# Patient Record
Sex: MALE | Race: WHITE | Employment: FULL TIME | URBAN - METROPOLITAN AREA
[De-identification: names, ages, dates, MRNs, and addresses within clinical notes are randomized per-mention and may not be internally consistent; named-entity substitution may affect disease eponyms.]

---

## 2017-01-26 ENCOUNTER — HOSPITAL ENCOUNTER (OUTPATIENT)
Dept: PHYSICAL THERAPY | Age: 62
Discharge: HOME | End: 2017-01-26
Payer: COMMERCIAL

## 2017-01-26 PROCEDURE — 97110 THERAPEUTIC EXERCISES: CPT

## 2017-01-26 PROCEDURE — 97162 PT EVAL MOD COMPLEX 30 MIN: CPT

## 2017-01-26 NOTE — PROGRESS NOTES
[facility]  [address]  [place], [address]  [phone] fax    Plan of Care/ Statement of Necessity for Physical Therapy Services    Patient name: Jennifer Bowen Start of Care: 2017   Referral source: Robert Fu MD : 1955    Medical Diagnosis: Pain in unspecified knee [M25.569]  Bilateral primary osteoarthritis of knee [M17.0]   Onset Date: 2016   Treatment Diagnosis: L TKR   Prior Hospitalization: see medical history Provider#: [id number]   Medications: Verified on Patient summary List    Comorbidities: Depression, Hearing impaired, TIA, B TKR   Prior Level of Function: The patient states he was able to perform road biking prior to his second TKR. Also improved ease of prolonged ambulation and standing. The Plan of Care and following information is based on the information from the initial evaluation. Assessment/ key information: The patient is a 57year old male s/p L TKR on 2016. He states he was in the hospital 3 days and was discharged home, however the knee became red and developed signs of infection so he was readmitted to the hospital for 5 additional days and underwent a series of antibiotics both by mouth and injected form. Since that time he has noted stiffness and difficulty with mobility. He has since returned to work and has difficulty with swelling/edema control after prolonged standing. He has impairments related to the procedure consisting of pain, decreased ROM, decreased flexibility, decreased edema, and poor ADL ease. He will benefit from skilled PT in order to address the above impairments.      Evaluation Complexity History MEDIUM  Complexity : 1-2 comorbidities / personal factors will impact the outcome/ POC ; Examination LOW Complexity : 1-2 Standardized tests and measures addressing body structure, function, activity limitation and / or participation in recreation  ;Presentation MEDIUM Complexity : Evolving with changing characteristics  ; Clinical Decision Making MEDIUM Complexity : FOTO score of 26-74  Overall Complexity Rating: MEDIUM  Problem List: pain affecting function, decrease ROM, decrease strength, edema affecting function, decrease ADL/ functional abilitiies, decrease activity tolerance, decrease flexibility/ joint mobility and decrease transfer abilities   Treatment Plan may include any combination of the following: Therapeutic exercise, Therapeutic activities, Neuromuscular re-education, Physical agent/modality, Gait/balance training, Manual therapy, Patient education, Self Care training, Functional mobility training, Home safety training and Stair training  Patient / Family readiness to learn indicated by: asking questions, trying to perform skills and interest  Persons(s) to be included in education: patient (P)  Barriers to Learning/Limitations: None  Patient Goal (s): As much range of motion as possible  Patient Self Reported Health Status: excellent  Rehabilitation Potential: good    Short Term Goals: To be accomplished in 2 weeks:   1. The patient will be independent and compliant with HEP to maximize therapeutic benefit. 2. The patient improve knee extension to lacking 6 degrees for improved ambulation ease. Long Term Goals: To be accomplished in 4 weeks:   1. The patient will improve FOTO score to 62 to maximize quality of life. 2. The patient will improve knee ROM from 4 to 120 to maximize ease of descending stairs. 3. The patient will improve mid patellar circumferential measurements to 42.5 cm to reduce quad lag and maximize stability in stance. 4. The patient will improve Ely test equal L and R for improved ease of curb negotiation. Frequency / Duration: Patient to be seen 2-3 times per week for 4 weeks.     Patient/ Caregiver education and instruction: Diagnosis, prognosis, self care, activity modification and exercises   [x]  Plan of care has been reviewed with PTA    Krish Patel, PT 1/26/2017 6:51 PM    ________________________________________________________________________    I certify that the above Therapy Services are being furnished while the patient is under my care. I agree with the treatment plan and certify that this therapy is necessary.     [de-identified] Signature:____________________  Date:____________Time: _________    Please sign and return to [facility] [address] [place] [address], [facility] [address]  [phone] fax

## 2017-01-27 NOTE — PROGRESS NOTES
PT DAILY TREATMENT NOTE     Patient Name: Sharon Triana  Date:2017  : 1955  [x]  Patient  Verified  Payor: BLUE CROSS / Plan: Yady Hernandez [id number] PPO / Product Type: PPO /    In time:5:04  Out time:5:55  Total Treatment Time (min): 51  Visit #: 1 of 10    Treatment Area: Pain in unspecified knee [M25.569]  Bilateral primary osteoarthritis of knee [M17.0]    SUBJECTIVE  Pain Level (0-10 scale): 1/10  Any medication changes, allergies to medications, adverse drug reactions, diagnosis change, or new procedure performed?: [x] No    [] Yes (see summary sheet for update)  Subjective functional status/changes:   [] No changes reported  The patient reports a chief complaint of swelling and stiffness L knee.     OBJECTIVE    Modality rationale: decrease edema, decrease inflammation and decrease pain to improve the patients ability to    Min Type Additional Details    [] Estim:  []Unatt       []IFC  []Premod                        []Other:  []w/ice   []w/heat  Position:  Location:    [] Estim: []Att    []TENS instruct  []NMES                    []Other:  []w/US   []w/ice   []w/heat  Position:  Location:    []  Traction: [] Cervical       []Lumbar                       [] Prone          []Supine                       []Intermittent   []Continuous Lbs:  [] before manual  [] after manual    []  Ultrasound: []Continuous   [] Pulsed                           []1MHz   []3MHz W/cm2:  Location:    []  Iontophoresis with dexamethasone         Location: [] Take home patch   [] In clinic    []  Ice     []  heat  []  Ice massage  []  Laser   []  Anodyne Position:  Location:    []  Laser with stim  []  Other:  Position:  Location:    []  Vasopneumatic Device Pressure:       [] lo [] med [] hi   Temperature: [] lo [] med [] hi   [] Skin assessment post-treatment:  []intact []redness- no adverse reaction    []redness  adverse reaction:     41 min [x]Eval                  []Re-Eval       10 min Therapeutic Exercise:  [] See flow sheet :   Rationale: increase ROM and increase strength to improve the patients ability to improve ADL ease      min Therapeutic Activity:  []  See flow sheet :   Rationale:   to improve the patients ability to       min Neuromuscular Re-education:  []  See flow sheet :   Rationale:   to improve the patients ability to      min Manual Therapy:     Rationale:  to      min Gait Training:  ___ feet with ___ device on level surfaces with ___ level of assist   Rationale: With   [] TE   [] TA   [] neuro   [] other: Patient Education: [x] Review HEP    [] Progressed/Changed HEP based on:   [] positioning   [] body mechanics   [] transfers   [] heat/ice application    [] other:      Other Objective/Functional Measures: See IE     Pain Level (0-10 scale) post treatment: 1/10    ASSESSMENT/Changes in Function: See POC. Patient will continue to benefit from skilled PT services to modify and progress therapeutic interventions, address functional mobility deficits, address ROM deficits, address strength deficits, analyze and address soft tissue restrictions, analyze and cue movement patterns, analyze and modify body mechanics/ergonomics, assess and modify postural abnormalities and instruct in home and community integration to attain remaining goals. [x]  See Plan of Care  []  See progress note/recertification  []  See Discharge Summary         Progress towards goals / Updated goals:  Short Term Goals: To be accomplished in 2 weeks:  1. The patient will be independent and compliant with HEP to maximize therapeutic benefit. 2. The patient improve knee extension to lacking 6 degrees for improved ambulation ease. Long Term Goals: To be accomplished in 4 weeks:  1. The patient will improve FOTO score to 62 to maximize quality of life. 2. The patient will improve knee ROM from 4 to 120 to maximize ease of descending stairs.   3. The patient will improve mid patellar circumferential measurements to 42.5 cm to reduce quad lag and maximize stability in stance.   4. The patient will improve Ely test equal L and R for improved ease of curb negotiation.      PLAN  []  Upgrade activities as tolerated     [x]  Continue plan of care  []  Update interventions per flow sheet       []  Discharge due to:_  []  Other:_      Angeles Saez, PT 1/26/2017  7:05 PM    Future Appointments  Date Time Provider Josh Wheatley   1/30/2017 4:30 PM Virgle Delude, PTA [facility]   2/1/2017 4:30 PM Tc Main, PTA [facility]   2/3/2017 4:30 PM Tc Main, PTA [facility]   2/6/2017 4:30 PM Virgle Delude, PTA [facility]   2/7/2017 5:00 PM [id number] [facility]   2/9/2017 4:30 PM [id number] [facility]   2/15/2017 4:30 PM Tc Main, PTA [facility]   2/16/2017 4:30 PM Angeles Saez, PT [facility]   2/20/2017 4:30 PM Virgle Delude, PTA [facility]   2/21/2017 4:30 PM Virgle Delude, PTA [facility]

## 2017-01-30 ENCOUNTER — HOSPITAL ENCOUNTER (OUTPATIENT)
Dept: PHYSICAL THERAPY | Age: 62
Discharge: HOME | End: 2017-01-30
Payer: COMMERCIAL

## 2017-01-30 PROCEDURE — 97110 THERAPEUTIC EXERCISES: CPT

## 2017-01-30 PROCEDURE — 97016 VASOPNEUMATIC DEVICE THERAPY: CPT

## 2017-01-30 PROCEDURE — 97140 MANUAL THERAPY 1/> REGIONS: CPT

## 2017-01-30 NOTE — PROGRESS NOTES
PT DAILY TREATMENT NOTE     Patient Name: Steve Quintana  Date:2017  : 1955  [x]  Patient  Verified  Payor: BLUE CROSS / Plan: Yady Hernandez [id number] PPO / Product Type: PPO /    In time:4:30  Out time:5:32  Total Treatment Time (min): 62  Visit #: 2 of 10    Treatment Area: Pain in unspecified knee [M25.569]  Bilateral primary osteoarthritis of knee [M17.0]    SUBJECTIVE  Pain Level (0-10 scale): 0/10  Any medication changes, allergies to medications, adverse drug reactions, diagnosis change, or new procedure performed?: [x] No    [] Yes (see summary sheet for update)  Subjective functional status/changes:   [x] No changes reported    OBJECTIVE    Modality rationale: decrease inflammation and decrease pain to improve the patients ability to perform ADL's.    Min Type Additional Details    [] Estim:  []Unatt       []IFC  []Premod                        []Other:  []w/ice   []w/heat  Position:  Location:    [] Estim: []Att    []TENS instruct  []NMES                    []Other:  []w/US   []w/ice   []w/heat  Position:  Location:    []  Traction: [] Cervical       []Lumbar                       [] Prone          []Supine                       []Intermittent   []Continuous Lbs:  [] before manual  [] after manual    []  Ultrasound: []Continuous   [] Pulsed                           []1MHz   []3MHz W/cm2:  Location:    []  Iontophoresis with dexamethasone         Location: [] Take home patch   [] In clinic    []  Ice     []  heat  []  Ice massage  []  Laser   []  Anodyne Position:  Location:    []  Laser with stim  []  Other:  Position:  Location:   10 [x]  Vasopneumatic Device Pressure:       [x] lo [] med [] hi   Temperature: [x] lo [] med [] hi   [] Skin assessment post-treatment:  []intact []redness- no adverse reaction    []redness  adverse reaction:     44 min Therapeutic Exercise:  [x] See flow sheet :   Rationale: increase ROM, increase strength and increase proprioception to improve the patients ability to perform functional activities. 8 min Manual Therapy:  Patella mobs, scar massage, STM/DTM distal ITB and HS, knee PROM. Rationale: decrease pain, increase ROM, increase tissue extensibility and decrease trigger points to improve activity tolerance. With   [x] TE   [] TA   [] neuro   [] other: Patient Education: [x] Review HEP    [] Progressed/Changed HEP based on:   [] positioning   [] body mechanics   [] transfers   [] heat/ice application    [] other:      Other Objective/Functional Measures: Initiated exercises per flow sheet. Cueing for mini-squat mechanics. Pain Level (0-10 scale) post treatment: 1/10    ASSESSMENT/Changes in Function: First F/U visit. Patient will continue to benefit from skilled PT services to modify and progress therapeutic interventions, address functional mobility deficits, address ROM deficits, address strength deficits, analyze and address soft tissue restrictions and analyze and cue movement patterns to attain remaining goals. [x]  See Plan of Care  []  See progress note/recertification  []  See Discharge Summary         Progress towards goals / Updated goals:  Short Term Goals: To be accomplished in 2 weeks:  1. The patient will be independent and compliant with HEP to maximize therapeutic benefit. - Pt reports HEP compliance. 1/30/2017  2. The patient improve knee extension to lacking 6 degrees for improved ambulation ease. Long Term Goals: To be accomplished in 4 weeks:  1. The patient will improve FOTO score to 62 to maximize quality of life. 2. The patient will improve knee ROM from 4 to 120 to maximize ease of descending stairs. 3. The patient will improve mid patellar circumferential measurements to 42.5 cm to reduce quad lag and maximize stability in stance. 4. The patient will improve Ely test equal L and R for improved ease of curb negotiation.      PLAN  []  Upgrade activities as tolerated     [x]  Continue plan of care  [] Update interventions per flow sheet       []  Discharge due to:_  []  Other:_      Ala Brand, PTA 1/30/2017  5:56 PM    Future Appointments  Date Time Provider Josh Wheatley   2/1/2017 4:30 PM Morena Sol, PTA [facility]   2/3/2017 4:30 PM Morena Sol, PTA [facility]   2/6/2017 4:30 PM Ala Brand, PTA [facility]   2/7/2017 5:00 PM [id number] [facility]   2/9/2017 4:30 PM [id number] [facility]   2/15/2017 4:30 PM Morena Sol, PTA [facility]   2/16/2017 4:30 PM Shelley Ceja, PT [facility]   2/20/2017 4:30 PM Ala Brand, PTA [facility]   2/21/2017 4:30 PM Ala Brand, PTA [facility]

## 2017-02-01 ENCOUNTER — APPOINTMENT (OUTPATIENT)
Dept: PHYSICAL THERAPY | Age: 62
End: 2017-02-01
Payer: COMMERCIAL

## 2017-02-03 ENCOUNTER — HOSPITAL ENCOUNTER (OUTPATIENT)
Dept: PHYSICAL THERAPY | Age: 62
Discharge: HOME | End: 2017-02-03
Payer: COMMERCIAL

## 2017-02-03 PROCEDURE — 97140 MANUAL THERAPY 1/> REGIONS: CPT

## 2017-02-03 PROCEDURE — 97016 VASOPNEUMATIC DEVICE THERAPY: CPT

## 2017-02-03 PROCEDURE — 97110 THERAPEUTIC EXERCISES: CPT

## 2017-02-03 NOTE — PROGRESS NOTES
PT DAILY TREATMENT NOTE     Patient Name: Maurilio Chacko  Date:2/3/2017  : 1955  [x]  Patient  Verified  Payor: BLUE CROSS / Plan: Yady Hernandez [id number] PPO / Product Type: PPO /    In time:4:30  Out time:5:26  Total Treatment Time (min): 64  Visit #: 3 of 10    Treatment Area: Pain in unspecified knee [M25.569]  Bilateral primary osteoarthritis of knee [M17.0]    SUBJECTIVE  Pain Level (0-10 scale): 0/10  Any medication changes, allergies to medications, adverse drug reactions, diagnosis change, or new procedure performed?: [x] No    [] Yes (see summary sheet for update)  Subjective functional status/changes:   [] No changes reported  Pt reports no current c/o pain. Pt reports compliance with HEP. OBJECTIVE    Modality rationale: decrease inflammation and decrease pain to improve the patients ability to tolerate ADLs.     Min Type Additional Details    [] Estim:  []Unatt       []IFC  []Premod                        []Other:  []w/ice   []w/heat  Position:  Location:    [] Estim: []Att    []TENS instruct  []NMES                    []Other:  []w/US   []w/ice   []w/heat  Position:  Location:    []  Traction: [] Cervical       []Lumbar                       [] Prone          []Supine                       []Intermittent   []Continuous Lbs:  [] before manual  [] after manual    []  Ultrasound: []Continuous   [] Pulsed                           []1MHz   []3MHz W/cm2:  Location:    []  Iontophoresis with dexamethasone         Location: [] Take home patch   [] In clinic    []  Ice     []  heat  []  Ice massage  []  Laser   []  Anodyne Position:  Location:    []  Laser with stim  []  Other:  Position:  Location:   10 [x]  Vasopneumatic Device Pressure:       [x] lo [] med [] hi   Temperature: [x] lo [] med [] hi   [x] Skin assessment post-treatment:  [x]intact []redness- no adverse reaction    []redness  adverse reaction:     38 min Therapeutic Exercise:  [x] See flow sheet :   Rationale: increase ROM and increase strength to improve the patients ability to tolerate ADLs. 8 min Manual Therapy:  PROM (L) Knee, scar massage, patella mobs   Rationale: decrease pain, increase ROM and increase tissue extensibility to tolerate ADLs. With   [] TE   [] TA   [] neuro   [] other: Patient Education: [x] Review HEP    [] Progressed/Changed HEP based on:   [] positioning   [] body mechanics   [] transfers   [] heat/ice application    [] other:      Other Objective/Functional Measures: flexed knee gait pattern on (L). Pain Level (0-10 scale) post treatment: 2-3/10    ASSESSMENT/Changes in Function: Pt has improved available TKE with PROM knee extension. Patient will continue to benefit from skilled PT services to modify and progress therapeutic interventions, address functional mobility deficits, address ROM deficits, address strength deficits, analyze and address soft tissue restrictions, analyze and cue movement patterns and analyze and modify body mechanics/ergonomics to attain remaining goals. []  See Plan of Care  []  See progress note/recertification  []  See Discharge Summary         Progress towards goals / Updated goals:  Short Term Goals: To be accomplished in 2 weeks:  1. The patient will be independent and compliant with HEP to maximize therapeutic benefit. - Pt reports HEP compliance. 1/30/2017  2. The patient improve knee extension to lacking 6 degrees for improved ambulation ease. Long Term Goals: To be accomplished in 4 weeks:  1. The patient will improve FOTO score to 62 to maximize quality of life. 2. The patient will improve knee ROM from 4 to 120 to maximize ease of descending stairs. 3. The patient will improve mid patellar circumferential measurements to 42.5 cm to reduce quad lag and maximize stability in stance.   4. The patient will improve Ely test equal L and R for improved ease of curb negotiation.        PLAN  []  Upgrade activities as tolerated     [x]  Continue plan of care  []  Update interventions per flow sheet       []  Discharge due to:_  []  Other:_      Gayle Stringer, PTA 2/3/2017  5:27 PM    Future Appointments  Date Time Provider Josh Wheatley   2/6/2017 4:30 PM [place] Scales, PTA [facility]   2/7/2017 5:00 PM [id number] [facility]   2/9/2017 4:30 PM [id number] [facility]   2/15/2017 4:30 PM Gayle Stringer, PTA [facility]   2/16/2017 4:30 PM Alayne Osler, PT [facility]   2/20/2017 4:30 PM [place] Scales, PTA [facility]   2/21/2017 4:30 PM Wendy Scales, PTA [facility]

## 2017-02-06 ENCOUNTER — HOSPITAL ENCOUNTER (OUTPATIENT)
Dept: PHYSICAL THERAPY | Age: 62
Discharge: HOME | End: 2017-02-06
Payer: COMMERCIAL

## 2017-02-06 PROCEDURE — 97016 VASOPNEUMATIC DEVICE THERAPY: CPT

## 2017-02-06 PROCEDURE — 97140 MANUAL THERAPY 1/> REGIONS: CPT

## 2017-02-06 PROCEDURE — 97110 THERAPEUTIC EXERCISES: CPT

## 2017-02-06 NOTE — PROGRESS NOTES
PT DAILY TREATMENT NOTE     Patient Name: Keila Montero  Date:2017  : 1955  [x]  Patient  Verified  Payor: BLUE CROSS / Plan: [facility] PPO / Product Type: PPO /    In time:4:36  Out time:5:30  Total Treatment Time (min): 47  Visit #: 4 of 10    Treatment Area: Pain in unspecified knee [M25.569]  Bilateral primary osteoarthritis of knee [M17.0]    SUBJECTIVE  Pain Level (0-10 scale): 1/10  Any medication changes, allergies to medications, adverse drug reactions, diagnosis change, or new procedure performed?: [x] No    [] Yes (see summary sheet for update)  Subjective functional status/changes:   [] No changes reported  \"It's been a bit sore and swollen. \"    OBJECTIVE    Modality rationale: decrease inflammation and decrease pain to improve the patients ability to perform ADL's.    Min Type Additional Details    [] Estim:  []Unatt       []IFC  []Premod                        []Other:  []w/ice   []w/heat  Position:  Location:    [] Estim: []Att    []TENS instruct  []NMES                    []Other:  []w/US   []w/ice   []w/heat  Position:  Location:    []  Traction: [] Cervical       []Lumbar                       [] Prone          []Supine                       []Intermittent   []Continuous Lbs:  [] before manual  [] after manual    []  Ultrasound: []Continuous   [] Pulsed                           []1MHz   []3MHz W/cm2:  Location:    []  Iontophoresis with dexamethasone         Location: [] Take home patch   [] In clinic    []  Ice     []  heat  []  Ice massage  []  Laser   []  Anodyne Position:  Location:    []  Laser with stim  []  Other:  Position:  Location:   10 [x]  Vasopneumatic Device Pressure:       [x] lo [] med [] hi   Temperature: [x] lo [] med [] hi   [] Skin assessment post-treatment:  []intact []redness- no adverse reaction    []redness  adverse reaction:     36 min Therapeutic Exercise:  [x] See flow sheet :   Rationale: increase ROM, increase strength and increase proprioception to improve the patients ability to perform functional activities. 8 min Manual Therapy:  Patella mobs, scar massage, DTM distal ITB and quads, popliteal release, PROM. Rationale: decrease pain, increase ROM, increase tissue extensibility and decrease trigger points to improve activity tolerance. With   [x] TE   [] TA   [] neuro   [] other: Patient Education: [x] Review HEP    [] Progressed/Changed HEP based on:   [] positioning   [] body mechanics   [] transfers   [] heat/ice application    [] other:      Other Objective/Functional Measures: Swelling still noted. Reaching ~0 degrees knee extension PROM. Pain Level (0-10 scale) post treatment: 1/10    ASSESSMENT/Changes in Function: Lateral knee pain at end range flexion. Patient will continue to benefit from skilled PT services to modify and progress therapeutic interventions, address functional mobility deficits, address ROM deficits, address strength deficits and analyze and address soft tissue restrictions to attain remaining goals. [x]  See Plan of Care  []  See progress note/recertification  []  See Discharge Summary         Progress towards goals / Updated goals:  Short Term Goals: To be accomplished in 2 weeks:  1. The patient will be independent and compliant with HEP to maximize therapeutic benefit. - Pt reports HEP compliance. 1/30/2017  2. The patient improve knee extension to lacking 6 degrees for improved ambulation ease. Long Term Goals: To be accomplished in 4 weeks:   1. The patient will improve FOTO score to 62 to maximize quality of life. 2. The patient will improve knee ROM from 4 to 120 to maximize ease of descending stairs. 3. The patient will improve mid patellar circumferential measurements to 42.5 cm to reduce quad lag and maximize stability in stance. 4. The patient will improve Ely test equal L and R for improved ease of curb negotiation.      PLAN  []  Upgrade activities as tolerated     [x] Continue plan of care  []  Update interventions per flow sheet       []  Discharge due to:_  []  Other:_      Wendy Scales, PTA 2/6/2017  4:29 PM    Future Appointments  Date Time Provider Josh Wheatley   2/6/2017 4:30 PM Wendy Scales, PTA [facility]   2/7/2017 5:00 PM [id number] [facility]   2/9/2017 4:30 PM [id number] [facility]   2/15/2017 4:30 PM Gayle Stringer, PTA [facility]   2/16/2017 4:30 PM Alayne Osler, PT [facility]   2/20/2017 4:30 PM [place] Scales, PTA [facility]   2/21/2017 4:30 PM Wendy Scales, PTA [facility]

## 2017-02-07 ENCOUNTER — HOSPITAL ENCOUNTER (OUTPATIENT)
Dept: PHYSICAL THERAPY | Age: 62
Discharge: HOME | End: 2017-02-07
Payer: COMMERCIAL

## 2017-02-07 PROCEDURE — 97140 MANUAL THERAPY 1/> REGIONS: CPT

## 2017-02-07 PROCEDURE — 97110 THERAPEUTIC EXERCISES: CPT

## 2017-02-07 PROCEDURE — 97016 VASOPNEUMATIC DEVICE THERAPY: CPT

## 2017-02-07 NOTE — PROGRESS NOTES
PT DAILY TREATMENT NOTE     Patient Name: Keila Montero  Date:2017  : 1955  [x]  Patient  Verified  Payor: BLUE CROSS / Plan: [facility] PPO / Product Type: PPO /    In time:5:00  Out time:5:56  Total Treatment Time (min): 64  Visit #: 5 of 10    Treatment Area: Pain in unspecified knee [M25.569]  Bilateral primary osteoarthritis of knee [M17.0]    SUBJECTIVE  Pain Level (0-10 scale): 0  Any medication changes, allergies to medications, adverse drug reactions, diagnosis change, or new procedure performed?: [x] No    [] Yes (see summary sheet for update)  Subjective functional status/changes:   [] No changes reported  \"A little stiffness but no pain today\"    OBJECTIVE    Modality rationale: decrease edema and decrease pain to improve the patients ability to perform daily tasks   Min Type Additional Details    [] Estim:  []Unatt       []IFC  []Premod                        []Other:  []w/ice   []w/heat  Position:  Location:    [] Estim: []Att    []TENS instruct  []NMES                    []Other:  []w/US   []w/ice   []w/heat  Position:  Location:    []  Traction: [] Cervical       []Lumbar                       [] Prone          []Supine                       []Intermittent   []Continuous Lbs:  [] before manual  [] after manual    []  Ultrasound: []Continuous   [] Pulsed                           []1MHz   []3MHz W/cm2:  Location:    []  Iontophoresis with dexamethasone         Location: [] Take home patch   [] In clinic    []  Ice     []  heat  []  Ice massage  []  Laser   []  Anodyne Position:  Location:    []  Laser with stim  []  Other:  Position:  Location:   10 [x]  Vasopneumatic Device Pressure:       [x] lo [] med [] hi   Temperature: [x] lo [] med [] hi   [] Skin assessment post-treatment:  []intact []redness- no adverse reaction    []redness  adverse reaction:          38 min Therapeutic Exercise:  [] See flow sheet :   Rationale: increase ROM and increase strength to improve the patients ability to ambulate with increased ease for ADLs and functional tasks    8 min Manual Therapy:  L knee PROM, patellar mobs, stick massage vastus lateralis, ITB, HS and pop space   Rationale: increase ROM and increase tissue extensibility to improve functional mobility and joint mechanics          With   [] TE   [] TA   [] neuro   [] other: Patient Education: [x] Review HEP    [] Progressed/Changed HEP based on:   [] positioning   [] body mechanics   [] transfers   [] heat/ice application    [] other:      Other Objective/Functional Measures: progressed to 6\" step ups with good tolerance and form     L kne ext AROM 5 deg     Pain Level (0-10 scale) post treatment: 1    ASSESSMENT/Changes in Function: Pt presents with soft tissue restrictions in posterior L LE limiting terminal knee extension. Continues to report lateral knee pain with end range knee flexion. Slow progress with active knee extension at this time, educated pt on goal to promote most ROM possible before initiating increased strengthening as pt inquires about gym training for L LE strength. Patient will continue to benefit from skilled PT services to modify and progress therapeutic interventions, address functional mobility deficits, address ROM deficits, address strength deficits, analyze and address soft tissue restrictions, analyze and cue movement patterns, analyze and modify body mechanics/ergonomics and instruct in home and community integration to attain remaining goals. []  See Plan of Care  []  See progress note/recertification  []  See Discharge Summary         Progress towards goals / Updated goals:  Short Term Goals: To be accomplished in 2 weeks:  1. The patient will be independent and compliant with HEP to maximize therapeutic benefit. - Pt reports HEP compliance. 1/30/2017  2. The patient improve knee extension to lacking 6 degrees for improved ambulation ease. 5 deg today 2/7/17  Long Term Goals:  To be accomplished in 4 weeks:   1. The patient will improve FOTO score to 62 to maximize quality of life. Progressed to 52 2/7/17  2. The patient will improve knee ROM from 4 to 120 to maximize ease of descending stairs. 3. The patient will improve mid patellar circumferential measurements to 42.5 cm to reduce quad lag and maximize stability in stance. 4. The patient will improve Ely test equal L and R for improved ease of curb negotiation.      PLAN  []  Upgrade activities as tolerated     [x]  Continue plan of care  []  Update interventions per flow sheet       []  Discharge due to:_  []  Other:_      Pete Monroy DPT 2/7/2017  4:59 PM    Future Appointments  Date Time Provider Josh Wheatley   2/7/2017 5:00 PM [id number] [facility]   2/9/2017 4:30 PM [id number] [facility]   2/15/2017 4:30 PM Kd Jamison, PTA [facility]   2/16/2017 4:30 PM Eddy Ocasio, PT [facility]   2/20/2017 4:30 PM Kings Welch, PTA [facility]   2/21/2017 4:30 PM Kings Welch, PTA [facility]

## 2017-02-09 ENCOUNTER — HOSPITAL ENCOUNTER (OUTPATIENT)
Dept: PHYSICAL THERAPY | Age: 62
Discharge: HOME | End: 2017-02-09
Payer: COMMERCIAL

## 2017-02-09 PROCEDURE — 97140 MANUAL THERAPY 1/> REGIONS: CPT

## 2017-02-09 PROCEDURE — 97016 VASOPNEUMATIC DEVICE THERAPY: CPT

## 2017-02-09 PROCEDURE — 97110 THERAPEUTIC EXERCISES: CPT

## 2017-02-15 ENCOUNTER — HOSPITAL ENCOUNTER (OUTPATIENT)
Dept: PHYSICAL THERAPY | Age: 62
Discharge: HOME | End: 2017-02-15
Payer: COMMERCIAL

## 2017-02-15 PROCEDURE — 97140 MANUAL THERAPY 1/> REGIONS: CPT

## 2017-02-15 PROCEDURE — 97016 VASOPNEUMATIC DEVICE THERAPY: CPT

## 2017-02-15 PROCEDURE — 97110 THERAPEUTIC EXERCISES: CPT

## 2017-02-15 NOTE — PROGRESS NOTES
PT DAILY TREATMENT NOTE     Patient Name: Jessica Sneed  Date:2/15/2017  : 1955  [x]  Patient  Verified  Payor: BLUE CROSS / Plan: [id number] PPO / Product Type: PPO /    In time:4:30  Out time:5:29  Total Treatment Time (min): 60  Visit #: 7 of 10    Treatment Area: Pain in unspecified knee [M25.569]  Bilateral primary osteoarthritis of knee [M17.0]    SUBJECTIVE  Pain Level (0-10 scale): 1/10  Any medication changes, allergies to medications, adverse drug reactions, diagnosis change, or new procedure performed?: [x] No    [] Yes (see summary sheet for update)  Subjective functional status/changes:   [] No changes reported  Pt reports no new complaints. Pt reports compliance with HEP. OBJECTIVE    Modality rationale: decrease inflammation and decrease pain to improve the patients ability to tolerate ADLs.     Min Type Additional Details    [] Estim:  []Unatt       []IFC  []Premod                        []Other:  []w/ice   []w/heat  Position:  Location:    [] Estim: []Att    []TENS instruct  []NMES                    []Other:  []w/US   []w/ice   []w/heat  Position:  Location:    []  Traction: [] Cervical       []Lumbar                       [] Prone          []Supine                       []Intermittent   []Continuous Lbs:  [] before manual  [] after manual    []  Ultrasound: []Continuous   [] Pulsed                           []1MHz   []3MHz W/cm2:  Location:    []  Iontophoresis with dexamethasone         Location: [] Take home patch   [] In clinic    []  Ice     []  heat  []  Ice massage  []  Laser   []  Anodyne Position:  Location:    []  Laser with stim  []  Other:  Position:  Location:   10 [x]  Vasopneumatic Device Pressure:       [x] lo [] med [] hi   Temperature: [x] lo [] med [] hi   [] Skin assessment post-treatment:  []intact []redness- no adverse reaction    []redness  adverse reaction:       42 min Therapeutic Exercise:  [x] See flow sheet :   Rationale: increase ROM and increase strength to improve the patients ability to tolerate ADLs. 8 min Manual Therapy:  PROM (L) knee, ITB massage   Rationale: decrease pain, increase ROM and increase tissue extensibility to improve tolerance to ADLs. With   [] TE   [] TA   [] neuro   [] other: Patient Education: [x] Review HEP    [] Progressed/Changed HEP based on:   [] positioning   [] body mechanics   [] transfers   [] heat/ice application    [] other:      Other Objective/Functional Measures: added supine ITB stretch     Pain Level (0-10 scale) post treatment: 2/10    ASSESSMENT/Changes in Function: significant TTP to (L)  Distal ITB with tension noted. Patient will continue to benefit from skilled PT services to modify and progress therapeutic interventions, address functional mobility deficits, address ROM deficits, address strength deficits, analyze and address soft tissue restrictions, analyze and cue movement patterns and analyze and modify body mechanics/ergonomics to attain remaining goals. []  See Plan of Care  []  See progress note/recertification  []  See Discharge Summary         Progress towards goals / Updated goals:  Short Term Goals: To be accomplished in 2 weeks:  1. The patient will be independent and compliant with HEP to maximize therapeutic benefit. - Pt reports HEP compliance. 1/30/2017  2. The patient improve knee extension to lacking 6 degrees for improved ambulation ease. 5 deg today 2/7/17  Long Term Goals: To be accomplished in 4 weeks:   1. The patient will improve FOTO score to 62 to maximize quality of life. Progressed to 52 2/7/17  2. The patient will improve knee ROM from 4 to 120 to maximize ease of descending stairs. 4-110 2/9/17  3. The patient will improve mid patellar circumferential measurements to 42.5 cm to reduce quad lag and maximize stability in stance. 4. The patient will improve Ely test equal L and R for improved ease of curb negotiation.      PLAN  []  Upgrade activities as tolerated     [x]  Continue plan of care  []  Update interventions per flow sheet       []  Discharge due to:_  []  Other:_      Kellie Winn PTA 2/15/2017  5:03 PM    Future Appointments  Date Time Provider Josh Wheatley   2/16/2017 4:30 PM Theodor Lois, PT [facility]   2/20/2017 4:30 PM Sofia Glance, PTA [facility]   2/21/2017 4:30 PM Sofia Glance, PTA [facility]

## 2017-02-16 ENCOUNTER — HOSPITAL ENCOUNTER (OUTPATIENT)
Dept: PHYSICAL THERAPY | Age: 62
Discharge: HOME | End: 2017-02-16
Payer: COMMERCIAL

## 2017-02-16 PROCEDURE — 97140 MANUAL THERAPY 1/> REGIONS: CPT

## 2017-02-16 PROCEDURE — 97110 THERAPEUTIC EXERCISES: CPT

## 2017-02-16 PROCEDURE — 97016 VASOPNEUMATIC DEVICE THERAPY: CPT

## 2017-02-16 NOTE — PROGRESS NOTES
PT DAILY TREATMENT NOTE     Patient Name: Naif   Date:2017  : 1955  [x]  Patient  Verified  Payor: BLUE CROSS / Plan: [employer] [id number] PPO / Product Type: PPO /    In time:4:30  Out time:5:32  Total Treatment Time (min): 62  Visit #: 8 of 8     Treatment Area: Pain in unspecified knee [M25.569]  Bilateral primary osteoarthritis of knee [M17.0]    SUBJECTIVE  Pain Level (0-10 scale): 1/10  Any medication changes, allergies to medications, adverse drug reactions, diagnosis change, or new procedure performed?: [x] No    [] Yes (see summary sheet for update)  Subjective functional status/changes:   [] No changes reported  The patient reports he feels his knee is improving. OBJECTIVE  Modality rationale: decrease edema, decrease inflammation and decrease pain to improve the patients ability to improve ADL ease    Min Type Additional Details   10 [x]  Vasopneumatic Device Pressure:       [x] lo [] med [] hi   Temperature: [x] lo [] med [] hi   [] Skin assessment post-treatment:  []intact []redness- no adverse reaction    []redness  adverse reaction:     44 min Therapeutic Exercise:  [x] See flow sheet :   Rationale: increase ROM and increase strength to improve the patients ability to improve ADL ease . 8 min Manual Therapy:  PROM - flexion/extension mobs L knee   Rationale: decrease pain, increase ROM and increase tissue extensibility to improve ADL ease.      With   [] TE   [] TA   [] neuro   [] other: Patient Education: [x] Review HEP    [] Progressed/Changed HEP based on:   [] positioning   [] body mechanics   [] transfers   [] heat/ice application    [] other:      Other Objective/Functional Measures:   43 cm at mid patella    Nearly equal Ely test (L still more limited than R)     Pain Level (0-10 scale) post treatment: 0/10    ASSESSMENT/Changes in Function: The patient has demonstrated improvements with regards to knee mobility and edema up to this point. Patient will continue to benefit from skilled PT services to modify and progress therapeutic interventions, address functional mobility deficits, address ROM deficits, address strength deficits, analyze and address soft tissue restrictions, analyze and cue movement patterns, analyze and modify body mechanics/ergonomics, assess and modify postural abnormalities and instruct in home and community integration to attain remaining goals. []  See Plan of Care  []  See progress note/recertification  []  See Discharge Summary         Progress towards goals / Updated goals:  Short Term Goals: To be accomplished in 2 weeks:  1. The patient will be independent and compliant with HEP to maximize therapeutic benefit. - Pt reports HEP compliance. 1/30/2017  2. The patient improve knee extension to lacking 6 degrees for improved ambulation ease. 5 deg today 2/7/17  Long Term Goals: To be accomplished in 4 weeks:   1. The patient will improve FOTO score to 62 to maximize quality of life. Progressed to 52 2/7/17  2. The patient will improve knee ROM from 4 to 120 to maximize ease of descending stairs. 4-110 2/9/17  3. The patient will improve mid patellar circumferential measurements to 42.5 cm to reduce quad lag and maximize stability in stance. Nearly met 43 2/16/2017. 4. The patient will improve Ely test equal L and R for improved ease of curb negotiation.  Nearly met 2/16/2017     PLAN  []  Upgrade activities as tolerated     [x]  Continue plan of care  []  Update interventions per flow sheet       []  Discharge due to:_  []  Other:_      Alayne Osler, PT 2/16/2017  6:53 PM    Future Appointments  Date Time Provider Josh Wheatley   2/21/2017 4:30 PM Wendy Lee PTA [facility]   2/23/2017 5:00 PM Ольга Pal [facility]

## 2017-02-20 ENCOUNTER — APPOINTMENT (OUTPATIENT)
Dept: PHYSICAL THERAPY | Age: 62
End: 2017-02-20
Payer: COMMERCIAL

## 2017-02-21 ENCOUNTER — HOSPITAL ENCOUNTER (OUTPATIENT)
Dept: PHYSICAL THERAPY | Age: 62
Discharge: HOME | End: 2017-02-21
Payer: COMMERCIAL

## 2017-02-21 PROCEDURE — 97110 THERAPEUTIC EXERCISES: CPT

## 2017-02-21 PROCEDURE — 97016 VASOPNEUMATIC DEVICE THERAPY: CPT

## 2017-02-21 PROCEDURE — 97140 MANUAL THERAPY 1/> REGIONS: CPT

## 2017-02-21 NOTE — PROGRESS NOTES
PT DAILY TREATMENT NOTE     Patient Name: Sharon Triana  Date:2017  : 1955  [x]  Patient  Verified  Payor: BLUE CROSS / Plan: [employer] [id number] PPO / Product Type: PPO /    In time:4:30  Out time:5:23  Total Treatment Time (min): 48  Visit #: 9 of 10    Treatment Area: Pain in unspecified knee [M25.569]  Bilateral primary osteoarthritis of knee [M17.0]    SUBJECTIVE  Pain Level (0-10 scale): 1/10  Any medication changes, allergies to medications, adverse drug reactions, diagnosis change, or new procedure performed?: [x] No    [] Yes (see summary sheet for update)  Subjective functional status/changes:   [] No changes reported  \"A little discomfort today. \"    OBJECTIVE    Modality rationale: decrease inflammation and decrease pain to improve the patients ability to perform ADL's.    Min Type Additional Details    [] Estim:  []Unatt       []IFC  []Premod                        []Other:  []w/ice   []w/heat  Position:  Location:    [] Estim: []Att    []TENS instruct  []NMES                    []Other:  []w/US   []w/ice   []w/heat  Position:  Location:    []  Traction: [] Cervical       []Lumbar                       [] Prone          []Supine                       []Intermittent   []Continuous Lbs:  [] before manual  [] after manual    []  Ultrasound: []Continuous   [] Pulsed                           []1MHz   []3MHz W/cm2:  Location:    []  Iontophoresis with dexamethasone         Location: [] Take home patch   [] In clinic    []  Ice     []  heat  []  Ice massage  []  Laser   []  Anodyne Position:  Location:    []  Laser with stim  []  Other:  Position:  Location:   10 [x]  Vasopneumatic Device Pressure:       [x] lo [] med [] hi   Temperature: [x] lo [] med [] hi   [] Skin assessment post-treatment:  []intact []redness- no adverse reaction    []redness  adverse reaction:     35 min Therapeutic Exercise:  [x] See flow sheet :   Rationale: increase ROM, increase strength and increase proprioception to improve the patients ability to perform functional activities. 8 min Manual Therapy:  Patella mobs, scar massage, STM/DTM distal quads and ITB. PROM. Rationale: decrease pain, increase ROM, increase tissue extensibility and decrease trigger points to improve activity tolerance. With   [x] TE   [] TA   [] neuro   [] other: Patient Education: [x] Review HEP    [] Progressed/Changed HEP based on:   [] positioning   [] body mechanics   [] transfers   [] heat/ice application    [] other:      Other Objective/Functional Measures: AROM (L) Knee 3-112 degrees. Pain Level (0-10 scale) post treatment: 0/10    ASSESSMENT/Changes in Function: Limited extension lag noted with SLR's. Patient will continue to benefit from skilled PT services to modify and progress therapeutic interventions, address functional mobility deficits, address ROM deficits, address strength deficits, analyze and address soft tissue restrictions and analyze and modify body mechanics/ergonomics to attain remaining goals. [x]  See Plan of Care  []  See progress note/recertification  []  See Discharge Summary         Progress towards goals / Updated goals:  Short Term Goals: To be accomplished in 2 weeks:  1. The patient will be independent and compliant with HEP to maximize therapeutic benefit. - Pt reports HEP compliance. 1/30/2017  2. The patient improve knee extension to lacking 6 degrees for improved ambulation ease. 5 deg today 2/7/17  Long Term Goals: To be accomplished in 4 weeks:   1. The patient will improve FOTO score to 62 to maximize quality of life. Progressed to 52 2/7/17  2. The patient will improve knee ROM from 4 to 120 to maximize ease of descending stairs. - AROM (L) Knee 3-112 degrees. 2/21/2017  3. The patient will improve mid patellar circumferential measurements to 42.5 cm to reduce quad lag and maximize stability in stance. Nearly met 43 2/16/2017.    4. The patient will improve Ely test equal L and R for improved ease of curb negotiation.  Nearly met 2/16/2017    PLAN  []  Upgrade activities as tolerated     [x]  Continue plan of care  []  Update interventions per flow sheet       []  Discharge due to:_  []  Other:_      Cristine Garcia PTA 2/21/2017  4:27 PM    Future Appointments  Date Time Provider Josh Wheatley   2/21/2017 4:30 PM Cristine Garcia PTA [facility]   2/23/2017 5:00 PM Nato Lott [facility]

## 2017-02-23 ENCOUNTER — HOSPITAL ENCOUNTER (OUTPATIENT)
Dept: PHYSICAL THERAPY | Age: 62
Discharge: HOME | End: 2017-02-23
Payer: COMMERCIAL

## 2017-02-23 PROCEDURE — 97110 THERAPEUTIC EXERCISES: CPT

## 2017-02-23 NOTE — PROGRESS NOTES
PT DAILY TREATMENT NOTE 3-16    Patient Name: Jennifer Bowen  Date:2017  : 1955  [x]  Patient  Verified  Payor: BLUE CROSS / Plan: [facility] PPO / Product Type: PPO /    In time:5:00  Out time:5:35  Total Treatment Time (min): 35  Visit #: 10 of 10    Treatment Area: Pain in unspecified knee [M25.569]  Bilateral primary osteoarthritis of knee [M17.0]    SUBJECTIVE  Pain Level (0-10 scale): 1  Any medication changes, allergies to medications, adverse drug reactions, diagnosis change, or new procedure performed?: [x] No    [] Yes (see summary sheet for update)  Subjective functional status/changes:   [] No changes reported  \"My knee is still tight a little bit\"    OBJECTIVE  Modality rationale: NA   Min Type Additional Details    [] Estim:  []Unatt       []IFC  []Premod                        []Other:  []w/ice   []w/heat  Position:  Location:    [] Estim: []Att    []TENS instruct  []NMES                    []Other:  []w/US   []w/ice   []w/heat  Position:  Location:    []  Traction: [] Cervical       []Lumbar                       [] Prone          []Supine                       []Intermittent   []Continuous Lbs:  [] before manual  [] after manual    []  Ultrasound: []Continuous   [] Pulsed                           []1MHz   []3MHz Location:  W/cm2:    []  Iontophoresis with dexamethasone         Location: [] Take home patch   [] In clinic    []  Ice     []  heat  []  Ice massage  []  Laser   []  Anodyne Position:  Location:    []  Laser with stim  []  Other: Position:  Location:    []  Vasopneumatic Device Pressure:       [] lo [] med [] hi   Temperature: [x] lo [] med [] hi   [] Skin assessment post-treatment:  []intact []redness- no adverse reaction    []redness  adverse reaction:     35 min Therapeutic Exercise:  [x] See flow sheet :   Rationale: increase ROM, increase strength and improve coordination to improve the patients ability to perform functional tasks With   [] TE   [] TA   [] neuro   [] other: Patient Education: [x] Review HEP    [] Progressed/Changed HEP based on:   [] positioning   [] body mechanics   [] transfers   [] heat/ice application    [] other:      Other Objective/Functional Measures:   Patient states he is about \"80%\" to achieving PLOF since starting therapy. FOTO score of 64  Patient is 3-112 degrees L knee AROM. Pain Level (0-10 scale) post treatment: 1    ASSESSMENT/Changes in Function: Patient is progressing towards LTG to achieve knee ROM from 4 to 120 degrees. Patient has met improved FOTO, score of 64 today. Will continue to focus on increasing knee flexion. Patient will continue to benefit from skilled PT services to modify and progress therapeutic interventions, address functional mobility deficits, address ROM deficits, address strength deficits, analyze and address soft tissue restrictions, analyze and cue movement patterns, analyze and modify body mechanics/ergonomics and assess and modify postural abnormalities to attain remaining goals. []  See Plan of Care  []  See progress note/recertification  []  See Discharge Summary           Progress towards goals / Updated goals:  Short Term Goals: To be accomplished in 2 weeks:  1. The patient will be independent and compliant with HEP to maximize therapeutic benefit. - Pt reports HEP compliance. 1/30/2017.   2. The patient improve knee extension to lacking 6 degrees for improved ambulation ease. 5 deg today 2/7/17  Long Term Goals: To be accomplished in 4 weeks:   1. The patient will improve FOTO score to 62 to maximize quality of life. FOTO SCORE OF 64 2/23/2017  2. The patient will improve knee ROM from 4 to 120 to maximize ease of descending stairs. - AROM (L) Knee 3-112 degrees. 2/23/2017  3. The patient will improve mid patellar circumferential measurements to 42.5 cm to reduce quad lag and maximize stability in stance. Nearly met 43 2/16/2017.    4. The patient will improve Ely test equal L and R for improved ease of curb negotiation.  Nearly met 2/16/2017  PLAN  []  Upgrade activities as tolerated     [x]  Continue plan of care  []  Update interventions per flow sheet       []  Discharge due to:_  []  Other:_      Edith Elder 2/23/2017  2:42 PM    Future Appointments  Date Time Provider Josh Wheatley   2/23/2017 5:00 PM Edith Elder [facility]

## 2017-02-27 ENCOUNTER — APPOINTMENT (OUTPATIENT)
Dept: PHYSICAL THERAPY | Age: 62
End: 2017-02-27
Payer: COMMERCIAL

## 2017-03-03 ENCOUNTER — APPOINTMENT (OUTPATIENT)
Dept: PHYSICAL THERAPY | Age: 62
End: 2017-03-03

## 2017-03-07 ENCOUNTER — APPOINTMENT (OUTPATIENT)
Dept: PHYSICAL THERAPY | Age: 62
End: 2017-03-07

## 2017-03-09 ENCOUNTER — APPOINTMENT (OUTPATIENT)
Dept: PHYSICAL THERAPY | Age: 62
End: 2017-03-09

## 2017-03-13 ENCOUNTER — APPOINTMENT (OUTPATIENT)
Dept: PHYSICAL THERAPY | Age: 62
End: 2017-03-13

## 2017-03-15 ENCOUNTER — APPOINTMENT (OUTPATIENT)
Dept: PHYSICAL THERAPY | Age: 62
End: 2017-03-15

## 2017-07-03 NOTE — PROGRESS NOTES
[facility]  [address]  [place], [address]  [phone] fax    Physical Therapy Discharge Summary  Patient name: Marvin Issa Start of Care: 2017   Referral source: Zuleyma Duran MD : 1955                          Medical Diagnosis: Pain in unspecified knee [M25.569]  Bilateral primary osteoarthritis of knee [M17.0] Onset Date: 2016   Treatment Diagnosis: L TKR   Prior Hospitalization: see medical history Provider#: [id number]   Medications: Verified on Patient summary List    Comorbidities: Depression, Hearing impaired, TIA, B TKR   Prior Level of Function: The patient states he was able to perform road biking prior to his second TKR. Also improved ease of prolonged ambulation and standing. Visits from Start of Care: 10    Missed Visits: 2  Reporting Period : 2017 to 2017      Summary of Care:  Short Term Goals: To be accomplished in 2 weeks:  1. The patient will be independent and compliant with HEP to maximize therapeutic benefit. - Pt reports HEP compliance. 2017.   2. The patient improve knee extension to lacking 6 degrees for improved ambulation ease. 5 deg today 17  Long Term Goals: To be accomplished in 4 weeks:   1. The patient will improve FOTO score to 62 to maximize quality of life. FOTO SCORE OF 64 2017  2. The patient will improve knee ROM from 4 to 120 to maximize ease of descending stairs. - AROM (L) Knee 3-112 degrees. 2017  3. The patient will improve mid patellar circumferential measurements to 42.5 cm to reduce quad lag and maximize stability in stance. Nearly met 43 2017. 4. The patient will improve Ely test equal L and R for improved ease of curb negotiation. Nearly met 2017    ASSESSMENT/RECOMMENDATIONS: The patient was progressing well towards goals. He called and cancelled remaining visits limiting ability of traditional D/C.    Per last night, \"Patient is progressing towards LTG to achieve knee ROM from 4 to 120 degrees. Patient has met improved FOTO, score of 64 today. Will continue to focus on increasing knee flexion. \"    [x]Discontinue therapy: [x]Patient has reached or is progressing toward set goals      []Patient is non-compliant or has abdicated      []Due to lack of appreciable progress towards set [address], PT 7/3/2017 12:00 PM